# Patient Record
(demographics unavailable — no encounter records)

---

## 2024-11-08 NOTE — DISCUSSION/SUMMARY
[FreeTextEntry1] : HFpEF with increase in LE edema mod-severe TR  check TTE to evalaute other etiologies of sx, check labs if above without improvement will proceed with ischemia eval  -cont asa, statin for CAD -cont metoprolol 100mg daily -cont losartan 25mg daily -increase furosemide to 80mg until swelling improves - pt defers until we check labs first -cont Xarelto for afib  venous insufficiency -leg elevation, compression socks, low sodium diet  continue heart healthy diet recommend exercise as tolerated

## 2024-11-08 NOTE — PHYSICAL EXAM
[Normal Venous Pressure] : normal venous pressure [Soft] : abdomen soft [Non Tender] : non-tender [Abnormal Gait] : abnormal gait [Edema ___] : edema [unfilled] [Venous varicosities] : venous varicosities [Normal] : alert and oriented, normal memory [de-identified] : +HJR

## 2024-11-08 NOTE — CARDIOLOGY SUMMARY
[de-identified] : 7/10/24: Afib, low voltage, old anterior.inferior infarct 9/6/23: afib, anterior infarct, LAD, anterior infarct, nonspecific t wave changes 9/6/22: afib, anterior infarct 8/11/2021: atrial fibrillation, poor R wave progression, low voltage  [de-identified] : NST 9/30/22: no ischemia NST 6/27/2019 : Small anteroapical and inferolateral ischemia [de-identified] : 10/13/23: 1. Left ventricular cavity is normal. Left ventricular wall thickness is normal. Left ventricular systolic function is normal with an ejection fraction of 69 % by Hernandez's method of disks. There are no regional wall motion abnormalities seen. 2. Mild to moderate mitral regurgitation. 3. Moderate to severe tricuspid regurgitation. 4. Estimated pulmonary artery systolic pressure is 43 mmHg. 9/30/22: EF 65%, mild-mod MR, mod LAE, PASP 42mmHg 8/18/21: EF 55% 6/18/2019: severe biatrial enlargement, nl LVEF [de-identified] : 2019 - GERARDO? [de-identified] : 5/25/2019:\par  no venous insufficiency\par  6/18/2019:\par  moderate diffuse calcified atherosclerotic plaque with normal velocities

## 2024-11-08 NOTE — REVIEW OF SYSTEMS
[Dyspnea on exertion] : dyspnea during exertion [Chest Discomfort] : chest discomfort [Lower Ext Edema] : lower extremity edema [Palpitations] : palpitations [Orthopnea] : orthopnea [Joint Pain] : joint pain [Negative] : Heme/Lymph [SOB] : no shortness of breath [Leg Claudication] : no intermittent leg claudication [Syncope] : no syncope

## 2024-11-08 NOTE — CARDIOLOGY SUMMARY
[de-identified] : 7/10/24: Afib, low voltage, old anterior.inferior infarct 9/6/23: afib, anterior infarct, LAD, anterior infarct, nonspecific t wave changes 9/6/22: afib, anterior infarct 8/11/2021: atrial fibrillation, poor R wave progression, low voltage  [de-identified] : NST 9/30/22: no ischemia NST 6/27/2019 : Small anteroapical and inferolateral ischemia [de-identified] : 10/13/23: 1. Left ventricular cavity is normal. Left ventricular wall thickness is normal. Left ventricular systolic function is normal with an ejection fraction of 69 % by Hernandez's method of disks. There are no regional wall motion abnormalities seen. 2. Mild to moderate mitral regurgitation. 3. Moderate to severe tricuspid regurgitation. 4. Estimated pulmonary artery systolic pressure is 43 mmHg. 9/30/22: EF 65%, mild-mod MR, mod LAE, PASP 42mmHg 8/18/21: EF 55% 6/18/2019: severe biatrial enlargement, nl LVEF [de-identified] : 2019 - GERARDO? [de-identified] : 5/25/2019:\par  no venous insufficiency\par  6/18/2019:\par  moderate diffuse calcified atherosclerotic plaque with normal velocities

## 2024-11-08 NOTE — HISTORY OF PRESENT ILLNESS
[FreeTextEntry1] : 86F HTN, pre-DM, Afib on rivaroxaban, CAD s/p GERARDO , EASTON on CPAP, asthma, mod-severe TR and mild-mod pHTN who presents for follow-up.  still with some LE edema on furosemide 40mg daily saw vascular, no further interventions stable mild orthopnea progressive FAYE relatively sedentary lifestyle  also with some episodes of tachypalpitations overnight better during the day   No fam hx of premature CAD or SCD  Pregnancy hx: Twice. unknown what happened to other pregnancy Son - without PEC, GDM, or  labor

## 2024-11-08 NOTE — PHYSICAL EXAM
[Normal Venous Pressure] : normal venous pressure [Soft] : abdomen soft [Non Tender] : non-tender [Abnormal Gait] : abnormal gait [Edema ___] : edema [unfilled] [Venous varicosities] : venous varicosities [Normal] : alert and oriented, normal memory [de-identified] : +HJR

## 2025-06-04 NOTE — PHYSICAL EXAM
[Normal Venous Pressure] : normal venous pressure [Soft] : abdomen soft [Non Tender] : non-tender [Abnormal Gait] : abnormal gait [Edema ___] : edema [unfilled] [Venous varicosities] : venous varicosities [Normal] : alert and oriented, normal memory [de-identified] : +HJR

## 2025-06-04 NOTE — HISTORY OF PRESENT ILLNESS
[FreeTextEntry1] : 87F HTN, pre-DM, Afib on rivaroxaban, CAD s/p GERARDO , ESATON on CPAP, asthma, mod-severe TR and mild-mod pHTN who presents for follow-up.  still with some LE edema on furosemide 40mg daily saw vascular, s/p ablation at [facility] Dr. Akhil Carias  stable mild orthopnea progressive FAYE relatively sedentary lifestyle    No fam hx of premature CAD or SCD  Pregnancy hx: Twice. unknown what happened to other pregnancy Son - without PEC, GDM, or  labor

## 2025-06-04 NOTE — HISTORY OF PRESENT ILLNESS
[FreeTextEntry1] : 87F HTN, pre-DM, Afib on rivaroxaban, CAD s/p GERARDO , EASTON on CPAP, asthma, mod-severe TR and mild-mod pHTN who presents for follow-up.  still with some LE edema on furosemide 40mg daily saw vascular, s/p ablation at [facility] Dr. Akhil Carias  stable mild orthopnea progressive FAYE relatively sedentary lifestyle    No fam hx of premature CAD or SCD  Pregnancy hx: Twice. unknown what happened to other pregnancy Son - without PEC, GDM, or  labor

## 2025-06-04 NOTE — CARDIOLOGY SUMMARY
[de-identified] : 6/4/25: afib, low voltage, old anterior infarct 7/10/24: Afib, low voltage, old anterior.inferior infarct 9/6/23: afib, anterior infarct, LAD, anterior infarct, nonspecific t wave changes 9/6/22: afib, anterior infarct 8/11/2021: atrial fibrillation, poor R wave progression, low voltage  [de-identified] : NST 9/30/22: no ischemia NST 6/27/2019 : Small anteroapical and inferolateral ischemia [de-identified] : 12/12/24: 1. Left ventricular endocardium is not well visualized; however, the left ventricular systolic function appears grossly normal. 2. There is mild (grade 1) left ventricular diastolic dysfunction. 3. Normal right ventricular cavity size, with normal wall thickness, and normal right ventricular systolic function. 4. Left atrium is mildly dilated. 5. The right atrium is dilated. 6. Moderate tricuspid regurgitation. 7. Trileaflet aortic valve with normal systolic excursion. There is mild calcification of the aortic valve leaflets. 8. Estimated pulmonary artery systolic pressure is 36 mmHg, consistent with borderline pulmonary hypertension. 9. Compared to the transthoracic echocardiogram performed on 10/13/2023, there have been no significant interval changes.  10/13/23: 1. Left ventricular cavity is normal. Left ventricular wall thickness is normal. Left ventricular systolic function is normal with an ejection fraction of 69 % by Hernandez's method of disks. There are no regional wall motion abnormalities seen. 2. Mild to moderate mitral regurgitation. 3. Moderate to severe tricuspid regurgitation. 4. Estimated pulmonary artery systolic pressure is 43 mmHg. 9/30/22: EF 65%, mild-mod MR, mod LAE, PASP 42mmHg 8/18/21: EF 55% 6/18/2019: severe biatrial enlargement, nl LVEF [de-identified] : 2019 - GERARDO? [de-identified] : 5/25/2019:\par  no venous insufficiency\par  6/18/2019:\par  moderate diffuse calcified atherosclerotic plaque with normal velocities

## 2025-06-04 NOTE — PHYSICAL EXAM
[Normal Venous Pressure] : normal venous pressure [Soft] : abdomen soft [Non Tender] : non-tender [Abnormal Gait] : abnormal gait [Edema ___] : edema [unfilled] [Venous varicosities] : venous varicosities [Normal] : alert and oriented, normal memory [de-identified] : +HJR

## 2025-06-04 NOTE — CARDIOLOGY SUMMARY
[de-identified] : 6/4/25: afib, low voltage, old anterior infarct 7/10/24: Afib, low voltage, old anterior.inferior infarct 9/6/23: afib, anterior infarct, LAD, anterior infarct, nonspecific t wave changes 9/6/22: afib, anterior infarct 8/11/2021: atrial fibrillation, poor R wave progression, low voltage  [de-identified] : NST 9/30/22: no ischemia NST 6/27/2019 : Small anteroapical and inferolateral ischemia [de-identified] : 12/12/24: 1. Left ventricular endocardium is not well visualized; however, the left ventricular systolic function appears grossly normal. 2. There is mild (grade 1) left ventricular diastolic dysfunction. 3. Normal right ventricular cavity size, with normal wall thickness, and normal right ventricular systolic function. 4. Left atrium is mildly dilated. 5. The right atrium is dilated. 6. Moderate tricuspid regurgitation. 7. Trileaflet aortic valve with normal systolic excursion. There is mild calcification of the aortic valve leaflets. 8. Estimated pulmonary artery systolic pressure is 36 mmHg, consistent with borderline pulmonary hypertension. 9. Compared to the transthoracic echocardiogram performed on 10/13/2023, there have been no significant interval changes.  10/13/23: 1. Left ventricular cavity is normal. Left ventricular wall thickness is normal. Left ventricular systolic function is normal with an ejection fraction of 69 % by Hernandez's method of disks. There are no regional wall motion abnormalities seen. 2. Mild to moderate mitral regurgitation. 3. Moderate to severe tricuspid regurgitation. 4. Estimated pulmonary artery systolic pressure is 43 mmHg. 9/30/22: EF 65%, mild-mod MR, mod LAE, PASP 42mmHg 8/18/21: EF 55% 6/18/2019: severe biatrial enlargement, nl LVEF [de-identified] : 2019 - GERARDO? [de-identified] : 5/25/2019:\par  no venous insufficiency\par  6/18/2019:\par  moderate diffuse calcified atherosclerotic plaque with normal velocities

## 2025-06-04 NOTE — DISCUSSION/SUMMARY
[FreeTextEntry1] : HFpEF with increase in LE edema mod-severe TR  check TTE to evaluate other etiologies of sx, check labs if above without improvement will proceed with ischemia eval  -cont asa, statin for CAD -cont metoprolol 100mg daily -cont losartan 25mg daily -increase furosemide to 80mg until swelling improves - pt defers until we check labs first -cont Xarelto for afib  venous insufficiency -leg elevation, compression socks, low sodium diet  continue heart healthy diet recommend exercise as tolerated   pt reports multiple tests done - awaiting results